# Patient Record
Sex: MALE | Race: WHITE
[De-identification: names, ages, dates, MRNs, and addresses within clinical notes are randomized per-mention and may not be internally consistent; named-entity substitution may affect disease eponyms.]

---

## 2019-06-29 ENCOUNTER — HOSPITAL ENCOUNTER (EMERGENCY)
Dept: HOSPITAL 8 - ED | Age: 74
Discharge: HOME | End: 2019-06-29
Payer: MEDICARE

## 2019-06-29 VITALS — HEIGHT: 72 IN | WEIGHT: 267.42 LBS | BODY MASS INDEX: 36.22 KG/M2

## 2019-06-29 VITALS — SYSTOLIC BLOOD PRESSURE: 153 MMHG | DIASTOLIC BLOOD PRESSURE: 78 MMHG
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
## 2019-06-29 DIAGNOSIS — I10: ICD-10-CM
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
## 2019-06-29 DIAGNOSIS — M10.9: ICD-10-CM
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
## 2019-06-29 DIAGNOSIS — S53.432A: Primary | ICD-10-CM

## 2019-06-29 PROCEDURE — 99283 EMERGENCY DEPT VISIT LOW MDM: CPT

## 2020-06-15 ENCOUNTER — HOSPITAL ENCOUNTER (OUTPATIENT)
Dept: LAB | Facility: MEDICAL CENTER | Age: 75
End: 2020-06-15
Attending: INTERNAL MEDICINE
Payer: MEDICARE

## 2020-06-15 LAB
ALBUMIN SERPL BCP-MCNC: 4.1 G/DL (ref 3.2–4.9)
ALBUMIN/GLOB SERPL: 1.3 G/DL
ALP SERPL-CCNC: 51 U/L (ref 30–99)
ALT SERPL-CCNC: 23 U/L (ref 2–50)
ANION GAP SERPL CALC-SCNC: 9 MMOL/L (ref 7–16)
AST SERPL-CCNC: 21 U/L (ref 12–45)
BASOPHILS # BLD AUTO: 1.5 % (ref 0–1.8)
BASOPHILS # BLD: 0.07 K/UL (ref 0–0.12)
BILIRUB SERPL-MCNC: 0.6 MG/DL (ref 0.1–1.5)
BUN SERPL-MCNC: 13 MG/DL (ref 8–22)
CALCIUM SERPL-MCNC: 10 MG/DL (ref 8.5–10.5)
CHLORIDE SERPL-SCNC: 98 MMOL/L (ref 96–112)
CHOLEST SERPL-MCNC: 177 MG/DL (ref 100–199)
CO2 SERPL-SCNC: 27 MMOL/L (ref 20–33)
CREAT SERPL-MCNC: 0.76 MG/DL (ref 0.5–1.4)
EOSINOPHIL # BLD AUTO: 0.1 K/UL (ref 0–0.51)
EOSINOPHIL NFR BLD: 2.1 % (ref 0–6.9)
ERYTHROCYTE [DISTWIDTH] IN BLOOD BY AUTOMATED COUNT: 45.8 FL (ref 35.9–50)
FASTING STATUS PATIENT QL REPORTED: NORMAL
GLOBULIN SER CALC-MCNC: 3.2 G/DL (ref 1.9–3.5)
GLUCOSE SERPL-MCNC: 97 MG/DL (ref 65–99)
HCT VFR BLD AUTO: 50.6 % (ref 42–52)
HDLC SERPL-MCNC: 64 MG/DL
HGB BLD-MCNC: 16.7 G/DL (ref 14–18)
IMM GRANULOCYTES # BLD AUTO: 0.02 K/UL (ref 0–0.11)
IMM GRANULOCYTES NFR BLD AUTO: 0.4 % (ref 0–0.9)
LDLC SERPL CALC-MCNC: 96 MG/DL
LYMPHOCYTES # BLD AUTO: 1.26 K/UL (ref 1–4.8)
LYMPHOCYTES NFR BLD: 26.8 % (ref 22–41)
MCH RBC QN AUTO: 30.1 PG (ref 27–33)
MCHC RBC AUTO-ENTMCNC: 33 G/DL (ref 33.7–35.3)
MCV RBC AUTO: 91.3 FL (ref 81.4–97.8)
MONOCYTES # BLD AUTO: 0.53 K/UL (ref 0–0.85)
MONOCYTES NFR BLD AUTO: 11.3 % (ref 0–13.4)
NEUTROPHILS # BLD AUTO: 2.73 K/UL (ref 1.82–7.42)
NEUTROPHILS NFR BLD: 57.9 % (ref 44–72)
NRBC # BLD AUTO: 0 K/UL
NRBC BLD-RTO: 0 /100 WBC
PLATELET # BLD AUTO: 221 K/UL (ref 164–446)
PMV BLD AUTO: 9.4 FL (ref 9–12.9)
POTASSIUM SERPL-SCNC: 4.5 MMOL/L (ref 3.6–5.5)
PROT SERPL-MCNC: 7.3 G/DL (ref 6–8.2)
RBC # BLD AUTO: 5.54 M/UL (ref 4.7–6.1)
SODIUM SERPL-SCNC: 134 MMOL/L (ref 135–145)
TRIGL SERPL-MCNC: 86 MG/DL (ref 0–149)
WBC # BLD AUTO: 4.7 K/UL (ref 4.8–10.8)

## 2020-06-15 PROCEDURE — 80053 COMPREHEN METABOLIC PANEL: CPT

## 2020-06-15 PROCEDURE — 36415 COLL VENOUS BLD VENIPUNCTURE: CPT

## 2020-06-15 PROCEDURE — 85025 COMPLETE CBC W/AUTO DIFF WBC: CPT

## 2020-06-15 PROCEDURE — 80061 LIPID PANEL: CPT

## 2021-01-15 DIAGNOSIS — Z23 NEED FOR VACCINATION: ICD-10-CM

## 2024-04-23 ENCOUNTER — OFFICE VISIT (OUTPATIENT)
Dept: URGENT CARE | Facility: PHYSICIAN GROUP | Age: 79
End: 2024-04-23
Payer: MEDICARE

## 2024-04-23 VITALS
SYSTOLIC BLOOD PRESSURE: 124 MMHG | HEIGHT: 72 IN | HEART RATE: 89 BPM | RESPIRATION RATE: 16 BRPM | WEIGHT: 268.8 LBS | TEMPERATURE: 99 F | DIASTOLIC BLOOD PRESSURE: 68 MMHG | OXYGEN SATURATION: 99 % | BODY MASS INDEX: 36.41 KG/M2

## 2024-04-23 DIAGNOSIS — R07.89 CHEST DISCOMFORT: ICD-10-CM

## 2024-04-23 DIAGNOSIS — Z95.0 HISTORY OF PACEMAKER: ICD-10-CM

## 2024-04-23 RX ORDER — METOPROLOL SUCCINATE 25 MG/1
25 TABLET, EXTENDED RELEASE ORAL DAILY
COMMUNITY
Start: 2024-04-05 | End: 2024-05-05

## 2024-04-23 RX ORDER — ALLOPURINOL 300 MG/1
1 TABLET ORAL DAILY
COMMUNITY
Start: 2024-01-04

## 2024-04-23 RX ORDER — GABAPENTIN 800 MG/1
800 TABLET ORAL
COMMUNITY

## 2024-04-23 RX ORDER — LISINOPRIL AND HYDROCHLOROTHIAZIDE 25; 20 MG/1; MG/1
1 TABLET ORAL DAILY
COMMUNITY
Start: 2024-01-09

## 2024-04-23 ASSESSMENT — ENCOUNTER SYMPTOMS
NAUSEA: 0
VOMITING: 0
SHORTNESS OF BREATH: 0
FEVER: 0
EYE DISCHARGE: 0
COUGH: 0
EYE REDNESS: 0
PALPITATIONS: 0
LOWER EXTREMITY EDEMA: 0

## 2024-04-23 ASSESSMENT — FIBROSIS 4 INDEX: FIB4 SCORE: 1.99

## 2024-04-23 NOTE — PROGRESS NOTES
Subjective     Jos Armstrong is a 79 y.o. male who presents with Chest Pain (Finger tips purple, lightheaded, onset this afternoon )          This is a new problem.  The patient presents to clinic complaining of chest discomfort x today.  The patient states he was recently diagnosed with a complete heart block on 4/4/2024 and had an emergency pacemaker placed.  The patient states earlier today he was shopping at [email or web address] when he developed a slight discomfort to his chest, as well as an elevated heart rate and lightheadedness.  The patient states he also noticed that his fingertips were slightly purple.  The patient states this has completely resolved.  The patient reports no chest pain at this time.  He also reports no shortness of breath, lower extremity edema, nausea, vomiting, or palpitations.  The patient states he discoloration to his fingertips has improved.  The patient has not taken any OTC medications for his current symptoms.  The patient is concerned about his vitals.    Chest Pain   This is a new problem. The current episode started today. The onset quality is sudden. The problem occurs constantly. The problem has been resolved. The pain is mild. The quality of the pain is described as dull. The pain does not radiate. Pertinent negatives include no cough, fever, lower extremity edema, nausea, palpitations, shortness of breath or vomiting. He has tried nothing for the symptoms.     PMH:  has a past medical history of Hyperlipidemia and Hypertension.  MEDS:   Current Outpatient Medications:     gabapentin (NEURONTIN) 800 MG tablet, Take 800 mg by mouth., Disp: , Rfl:     metoprolol SR (TOPROL XL) 25 MG TABLET SR 24 HR, Take 25 mg by mouth every day., Disp: , Rfl:     allopurinol (ZYLOPRIM) 300 MG Tab, Take 1 Tablet by mouth every day., Disp: , Rfl:     lisinopril-hydrochlorothiazide (PRINZIDE) 20-25 MG per tablet, Take 1 Tablet by mouth every day., Disp: , Rfl:     lisinopril-hydrochlorothiazide  (PRINZIDE, ZESTORETIC) 20-25 MG per tablet, TAKE ONE TABLET BY MOUTH EVERY DAY, Disp: 90 Tab, Rfl: 3    allopurinol (ZYLOPRIM) 300 MG TABS, TAKE ONE TABLET BY MOUTH EVERY DAY, Disp: 100 Tab, Rfl: 3    cephALEXin (KEFLEX) 500 MG CAPS, Take 500 mg by mouth 3 times a day.  , Disp: , Rfl:     hydrocodone-acetaminophen (NORCO) 5-325 MG TABS per tablet, Take 1-2 Tabs by mouth every 6 hours as needed.  , Disp: , Rfl:     aspirin EC (ECOTRIN) 81 MG TBEC, Take 81 mg by mouth every day., Disp: , Rfl:   ALLERGIES: No Known Allergies  SURGHX:   Past Surgical History:   Procedure Laterality Date    COLONOSCOPY  2006    due in 2016     SOCHX:  reports that he has been smoking cigars. He has never used smokeless tobacco. He reports current alcohol use of about 0.5 - 3.5 oz of alcohol per week. He reports that he does not use drugs.  FH: Family history was reviewed, no pertinent findings to report    Review of Systems   Constitutional:  Negative for fever.   HENT:  Positive for congestion (The patient reports associated nasal congestion, which she attributes to allergies.).    Eyes:  Negative for discharge and redness.   Respiratory:  Negative for cough and shortness of breath.    Cardiovascular:  Positive for chest pain. Negative for palpitations and leg swelling.   Gastrointestinal:  Negative for nausea and vomiting.              Objective     /68 (BP Location: Right arm, Patient Position: Sitting, BP Cuff Size: Adult)   Pulse 89   Temp 37.2 °C (99 °F) (Temporal)   Resp 16   Ht 1.829 m (6')   Wt 122 kg (268 lb 12.8 oz)   SpO2 99%   BMI 36.46 kg/m²      Physical Exam  Constitutional:       General: He is not in acute distress.     Appearance: Normal appearance. He is well-developed. He is not ill-appearing.   HENT:      Head: Normocephalic and atraumatic.      Right Ear: External ear normal.      Left Ear: External ear normal.   Eyes:      Extraocular Movements: Extraocular movements intact.      Conjunctiva/sclera:  Conjunctivae normal.   Cardiovascular:      Rate and Rhythm: Normal rate and regular rhythm.      Heart sounds: Normal heart sounds.   Pulmonary:      Effort: Pulmonary effort is normal. No respiratory distress.      Breath sounds: Normal breath sounds. No wheezing.   Chest:      Comments:   The patient has Steri-Strips to his left chest wall overlying the incision to his pacemaker.  A localized area of ecchymosis is present to the patient's left pectoral region in various stages of healing.  Musculoskeletal:      Cervical back: Normal range of motion and neck supple.   Skin:     General: Skin is warm and dry.      Comments:   The patient has slight purple discoloration to the distal tips of the fingers to the bilateral hands, excluding his previous distal amputations of the fourth and fifth digits of the right hand.   Neurological:      Mental Status: He is alert and oriented to person, place, and time.               Progress:   EKG Interpretation   Interpreted by me   Rhythm: paced  Rate: normal   Axis: normal   Ectopy: none   Conduction: normal   ST Segments: no acute change   T Waves: no acute change   Q Waves: none   Clinical Impression: abnormal EKG                  Assessment & Plan          1. Chest discomfort    2. History of pacemaker    The patient's presenting symptoms and physical exam findings are consistent with chest discomfort.  The patient was recently diagnosed with a complete heart block on 4/4/2024 and had emergency pacemaker placed. The patient states earlier today he was shopping at [place] when he developed a slight discomfort to his chest, as well as an elevated heart rate and lightheadedness.  The patient states he also noticed that his fingertips were slightly purple.  This has since improved.  On physical exam, patient's heart was regular rate and rhythm without murmurs, gallops, or rubs.  The patient's lungs were good auscultation without wheezing or rhonchi, and his pulse ox was within  normal limits.  The patient had Steri-Strips to the left chest wall overlying the incision of his pacemaker.  A localized area of ecchymosis in various stages of healing was present to the left pectoral region.  The patient had slight purple discoloration to the distal tips of his fingers of the bilateral hands, excluding the previously distal amputations of the fourth and fifth digits of the right hand.  Patient is nontoxic and appears in no acute distress.  The patient's vital signs are stable and within normal limits.  He is afebrile today in clinic.  An EKG was obtained today in clinic, which shows a paced rhythm with a heart rate of 87.  There is no prior EKG for comparison.  Based on the patient's presenting symptoms and recent pacemaker placement, I do believe the patient would benefit from a higher level of care.  Therefore, I recommend the patient be transferred to the ED for further evaluation and management.  The patient declined ED evaluation at this time, stating he only wanted to check his vitals.  Advised the patient of the associate risks of declining further care in the ED, including permanent morbidity and or mortality.  The patient verbalized understanding.  The patient is alert and oriented, not intoxicated, and demonstrates appropriate decision-making capacity, and despite the associated risks, is continuing to decline ED evaluation at this time.  The patient states he will follow-up with his cardiologist.  Advised the patient to monitor for worsening signs or symptoms.  Recommend the patient follow-up with cardiology.  Discussed strict ED precautions with the patient, and he verbalized understanding.    Differential diagnoses, supportive care, and indications for immediate follow-up discussed with patient.   Instructed to return to clinic or nearest emergency department for any change in condition, further concerns, or worsening of symptoms.    I personally reviewed prior external notes and  test results pertinent to today's visit.  I have independently reviewed and interpreted all diagnostics ordered during this urgent care visit.     Please note that this dictation was created using voice recognition software. I have made every reasonable attempt to correct obvious errors, but I expect that there may be errors of grammar and possibly content that I did not discover before finalizing the note.     This note was electronically signed by Manuela Angulo PA-C

## 2024-04-24 LAB — EKG 4674: NORMAL
